# Patient Record
Sex: FEMALE | Race: WHITE | Employment: UNEMPLOYED | URBAN - METROPOLITAN AREA
[De-identification: names, ages, dates, MRNs, and addresses within clinical notes are randomized per-mention and may not be internally consistent; named-entity substitution may affect disease eponyms.]

---

## 2019-05-12 NOTE — PROGRESS NOTES
esophageal dysmotility, denies change in bowel habits or Sx of IBD  Genitourinary: denies change in urine amount or urine appearance, denies frothy urine or Hx of nephrolithiasis  Hematologic/Lymphatic: denies abnormal bruising or bleeding, denies Hx of blood clots or recurrent miscarriages, denies swollen LNs  Musculoskeletal:  refer to above HPI   Neurological: she reports occasional numbness and tingling \"sometimes in her arms, my hands and my feet\", denies focal weakness, denies Hx of seizure, denies change in gait, balance, or memory  Psychiatric: she feels depressed and anxiety on Lorazepam PRN states this gives her a HA  Endocrine: denies cold or heat intolerance  Allergic/Immunologic: denies nasal congestion, chronic asthma, or hives    Past Medical History:   Diagnosis Date    Anxiety         No past surgical history on file.     Social History     Socioeconomic History    Marital status:      Spouse name: Not on file    Number of children: Not on file    Years of education: Not on file    Highest education level: Not on file   Occupational History    Not on file   Social Needs    Financial resource strain: Not on file    Food insecurity:     Worry: Not on file     Inability: Not on file    Transportation needs:     Medical: Not on file     Non-medical: Not on file   Tobacco Use    Smoking status: Not on file   Substance and Sexual Activity    Alcohol use: Not on file    Drug use: Not on file    Sexual activity: Not on file   Lifestyle    Physical activity:     Days per week: Not on file     Minutes per session: Not on file    Stress: Not on file   Relationships    Social connections:     Talks on phone: Not on file     Gets together: Not on file     Attends Yazidism service: Not on file     Active member of club or organization: Not on file     Attends meetings of clubs or organizations: Not on file     Relationship status: Not on file    Intimate partner violence:     Fear of palpation, FROM but pt reports pain in b/l shoulders throughout abduction and IR, no pain w/ ER, +pain w/ Neer test  Lower extremities:   Hip: normal log roll, +VIKY and Derek test produces ipsilateral buttock/hip pain, trochanteric bursae TTP b/l   Knees: no warmth or effusion present, FROM   Ankles: no synovitis, FROM, Achilles tendons w/o swelling NTTP   Feet: no toe swelling or pain or warmth on palpation w/ FROM, negative MTP squeeze test  NEURO: CN II-XII grossly intact intact, face appears symmetrical, normal DTR, no evidence of muscle atrophy, 5/5 strength proximally and distally throughout in both upper and lower extremities, touch sensation grossly intact  INTEGUMENTARY: hyperkeratosis on plantar aspect of the R foot, there is minimal dry/scaly skin inside the R ear, no other psoriatic lesions, no scalp or nail involvement, pt has painted toenails, no petechiae, bruises, or palpable purpura, no patchy alopecia, no clubbing or digital ulcers  PSYCH: normal mood    LABS:  I personally reviewed prior labs including:  CBC w/ diff (1/23/19): normal WBC w/ diff, H/H 10.4/33.9, normal MCV (previously low)  Renal and hepatic function wnl (9/19/18)  CRP 21.8 mg/L, ESR 28 (2/19/19)  Negative RIGOBERTO x 2 (6/20/13, 12/14/17), negative dsDNA (6/20/13)  RIGOBERTO 1:80 w/ speckled pattern (2/21/19)  Negative RF x 3 (6/20/13, 12/14/17, 2/19/19)  Negative CCP (6/20/13)  Negative HLA B27 (9/9/16)  Uric acid 5.8 (5/1/15)  Vitamin D 25 OH 21.2 (8/4/14)  Negative tissue transglutaminase IgA (5/2/15)  HgbA1c 7.4 (1/23/19)  TSH wnl (1/23/19)  Negative HIV (1/17/18)  Negative RPR (8/4/14)    RADIOLOGY REVIEW:  I personally reviewed prior imaging including:  Nuclear bone scan (11/7/14): IMPRESSION:  1. There is abnormal uptake localizing to the upper sternum in the region of the sternal manubrial articulation. This is likely degenerative in etiology. Neoplastic etiology is considered less likely.  This could be further evaluated with CT scan of the chest if clinically warranted. 2. There is some uptake localizing to the b/l first rib costochondral cartilage. Given the intensity within the cartilage I suspect this may be posttraumatic or degenerative. X-rays (5/1/15) radiology report:  C-spine:  Reversal of cervical lordosis which can be seen in muscle spasm or whiplash injury    T-spine:  No acute osseous abnormality    L-spine:  Narrowing of L5-S1 interspace    SI joints: The b/l SI joints are patent. The visualized pelvis is intact. X-RAY L-SPINE (10/2/17) radiology report:  No evidence of fracture or traumatic bony abnormality is seen. No significant degenerative change is seen. X-ray L shoulder (4/28/18):  1. No discrete bony or soft tissue abnormality identified within the L shoulder. If patient's symptoms persist or if there is concern for internal derangement, additional imaging may be warranted. MRI C-spine (1/13/14):  1.  Large, diffuse disc protrusion and marginal osteophyte formation, C5-6 disc level, resulting in a mild degree of acquired central spinal stenosis, mild narrowing of the neuroforamina bilaterally. MRI T-spine (6/17/17): IMPRESSION:  No substantial central or foraminal narrowing. No abnormal cord signal.    CT chest (10/30/13): No axillary lymphadenopathy. No mediastinal lymphadenopathy. No pleural or pericardial effusions. CTA chest (8/4/14): Findings: Heart size is normal. There is no pericardial effusion. Thoracic aorta is unremarkable. There is adequate opacification of the pulmonary arteries and there is no evidence of acute or chronic pulmonary embolism. There is no thoracic lymphadenopathy or mass. The lungs are clear. There are no acute osseous abnormalities. PROCEDURE REVIEW:  c-SCOPE (7/29/15):   FINDINGS:   Perianal and rectal exam - fresh blood noted (patient started having her menses today)   Terminal ileum - 2 ulcers noted in the terminal ileum, with inflammation, mutiple biopsies done to r/o Crohn's   Moderate diverticulosis was noted in the descending colon, sigmoid colon   Random colon biopsies were obtained from the cecum+right colon, Transverse colon, left sided colon, rectum and sent to pathology to rule out microscopic colitis/inflammatory bowel disease . Small sized internal hemorrhoids noted with end on views as well as retroflexion in the rectum   Mild inflammation was noted in the rectum, biopsies were obtained . Above results were discussed w/ the pt in detail during today's visit. Per GI (Dr. Margarita Cleary) note from 12/6/16:  10/14/2016 EGD  - Normal esophagus.  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 Biopsied. Treated with argon plasma  coagulation (APC). - Normal examined duodenum. 10/14/2016-Colonoscopy  - Three 3 to 6 mm polyps in the distal sigmoid colon. Resected and retrieved. - Diverticulosis in the sigmoid colon and in the descending colon. - Normal mucosa in the descending colon, at the splenic flexure, in the transverse colon, at the hepatic flexure,  in the ascending colon and in the cecum.  - Mild ileitis found in the terminal ileum. Biopsied. FINAL DIAGNOSIS:  A.   Stomach, biopsy:       - Chemical gastropathy.       - Negative for H. pylori on HE stain and immunostain. B.   Terminal ileum, biopsy:       - No significant abnormality. C.   Sigmoid colon, biopsy:       - Hyperplastic polyp. 10/14/2016 EGD  - Normal esophagus.  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 Biopsied. Treated with argon plasma coagulation (APC). - Normal examined duodenum. 10/14/2016-Colonoscopy  - Three 3 to 6 mm polyps in the distal sigmoid colon. Resected and retrieved. - Diverticulosis in the sigmoid colon and in the descending colon.   - Normal mucosa in the descending colon, at the splenic flexure, in the transverse colon, at the hepatic flexure,  in the ascending colon and in the cecum.  - Mild ileitis ESR elevation could be a combination of her morbid obesity and chronic anemia. Will complete rheumatologic w/u by checking MRI of the symptomatic hand (L hand) and MRI of pelvis to evaluate for subclinical synovitis/tenosynovitis although I have low suspicion for inflammatory arthritis. Discussed the disease course of fibromyalgia, unfortunately pt is not interested in pharmacotherapy (declined Gabapentin and states she could not tolerate Cyclobenzaprine or Tizanidine in the past) currently and she is also not interested in PT. She has pan-positive ROS and unfortunately is dissatisfied w/ multiple specialists in the past.  Per pt request made referral to GI for her chronic GERD, epigatric pain, chronic anemia, and prior abnormal endoscopy findings. Made referral to Dermatology for second opinion/skin Bx on her chronic campos plantar aspect of R foot, she has no other psoriatic lesions on exam today. Cont Clobetasol cream as prescribed. Orders Placed This Encounter   Procedures    MRI Hand Left WO Contrast     Standing Status:   Future     Standing Expiration Date:   5/14/2020     Order Specific Question:   Reason for exam:     Answer:   fat suppression/STIR sequences evaluate for synovitis, tenosynovitis, other inflammatory changes and infection    MRI PELVIS WO CONTRAST     Standing Status:   Future     Standing Expiration Date:   5/14/2020     Order Specific Question:   Reason for exam:     Answer:   STIR sequence evaluate for sacroiliitis    RIGOBERTO     Standing Status:   Future     Standing Expiration Date:   5/14/2020    C3 Complement     Standing Status:   Future     Standing Expiration Date:   11/14/2019    C4 Complement     Standing Status:   Future     Standing Expiration Date:   11/14/2019    Urinalysis with Microscopic     Standing Status:   Future     Standing Expiration Date:   11/14/2019     Order Specific Question:   SPECIFY(EX-CATH,MIDSTREAM,CYSTO,ETC)?      Answer:   midstream    GABRIELLA Real MD, Gastroenterology, [facility]     Referral Priority:   Routine     Referral Type:   Eval and Treat     Referral Reason:   Specialty Services Required     Referred to Provider:   Naheed Sheppard MD     Requested Specialty:   Gastroenterology     Number of Visits Requested:   Iraj Rodriguez MD, Dermatology, [facility]     Referral Priority:   Routine     Referral Type:   Eval and Treat     Referral Reason:   Specialty Services Required     Referred to Provider:   Sultana Barnett MD     Requested Specialty:   Dermatology     Number of Visits Requested:   1     Outpatient Encounter Medications as of 5/14/2019   Medication Sig Dispense Refill    acetaminophen-codeine (TYLENOL/CODEINE #3) 300-30 MG per tablet Take by mouth.  clobetasol (TEMOVATE) 0.05 % cream Apply topically      calcipotriene (DOVONEX) 0.005 % cream Apply topically      lorazepam (ATIVAN) 1 MG tablet Take 1 mg by mouth as needed.  [DISCONTINUED] Omega-3 Fatty Acids (FISH OIL PO) Take  by mouth. No facility-administered encounter medications on file as of 5/14/2019. Return if symptoms worsen or fail to improve. 5/14/2019 2:23 PM      Thank you very much for allowing me to participate in this patient's care. Please do not hesitate to contact me if I can be of further assistance. Sending consult note to referring provider, CHARLINE Sandoval. Akin Saldivar MD  [facility]  [address] [place] [address] [address]  MZKH:[phone]  JOANN:[phone]    NOTE: This report is transcribed by using voice recognition software dragon. Every effort is made to ensure accuracy; however, inadvertent computerized transcription errors may be present.

## 2019-05-14 ENCOUNTER — OFFICE VISIT (OUTPATIENT)
Dept: RHEUMATOLOGY | Age: 39
End: 2019-05-14
Payer: COMMERCIAL

## 2019-05-14 VITALS
WEIGHT: 293 LBS | HEART RATE: 97 BPM | SYSTOLIC BLOOD PRESSURE: 145 MMHG | HEIGHT: 67 IN | DIASTOLIC BLOOD PRESSURE: 102 MMHG | BODY MASS INDEX: 45.99 KG/M2

## 2019-05-14 DIAGNOSIS — R70.0 ELEVATED ERYTHROCYTE SEDIMENTATION RATE: ICD-10-CM

## 2019-05-14 DIAGNOSIS — R79.82 ELEVATED C-REACTIVE PROTEIN (CRP): ICD-10-CM

## 2019-05-14 DIAGNOSIS — R76.8 POSITIVE ANA (ANTINUCLEAR ANTIBODY): ICD-10-CM

## 2019-05-14 DIAGNOSIS — R53.82 CHRONIC FATIGUE: ICD-10-CM

## 2019-05-14 DIAGNOSIS — G89.29 CHRONIC BACK PAIN GREATER THAN 3 MONTHS DURATION: ICD-10-CM

## 2019-05-14 DIAGNOSIS — M25.50 MULTIPLE JOINT PAIN: Primary | ICD-10-CM

## 2019-05-14 DIAGNOSIS — M54.9 CHRONIC BACK PAIN GREATER THAN 3 MONTHS DURATION: ICD-10-CM

## 2019-05-14 DIAGNOSIS — M79.7 FIBROMYALGIA: ICD-10-CM

## 2019-05-14 DIAGNOSIS — L40.9 PSORIASIS: ICD-10-CM

## 2019-05-14 DIAGNOSIS — R10.13 EPIGASTRIC PAIN: ICD-10-CM

## 2019-05-14 DIAGNOSIS — M25.50 MULTIPLE JOINT PAIN: ICD-10-CM

## 2019-05-14 LAB
BILIRUBIN URINE: NEGATIVE
BLOOD, URINE: NEGATIVE
C3 COMPLEMENT: 226.4 MG/DL (ref 90–180)
C4 COMPLEMENT: 33.5 MG/DL (ref 10–40)
CLARITY: CLEAR
COLOR: YELLOW
EPITHELIAL CELLS, UA: 7 /HPF (ref 0–5)
GLUCOSE URINE: NEGATIVE MG/DL
HYALINE CASTS: 6 /LPF (ref 0–8)
KETONES, URINE: NEGATIVE MG/DL
LEUKOCYTE ESTERASE, URINE: NEGATIVE
MICROSCOPIC EXAMINATION: ABNORMAL
NITRITE, URINE: NEGATIVE
PH UA: 6 (ref 5–8)
PROTEIN UA: NEGATIVE MG/DL
RBC UA: 8 /HPF (ref 0–4)
SPECIFIC GRAVITY UA: 1.02 (ref 1–1.03)
UROBILINOGEN, URINE: 0.2 E.U./DL
WBC UA: 2 /HPF (ref 0–5)

## 2019-05-14 PROCEDURE — G8427 DOCREV CUR MEDS BY ELIG CLIN: HCPCS | Performed by: INTERNAL MEDICINE

## 2019-05-14 PROCEDURE — G8417 CALC BMI ABV UP PARAM F/U: HCPCS | Performed by: INTERNAL MEDICINE

## 2019-05-14 PROCEDURE — 99245 OFF/OP CONSLTJ NEW/EST HI 55: CPT | Performed by: INTERNAL MEDICINE

## 2019-05-14 RX ORDER — CALCIPOTRIENE 50 UG/G
CREAM TOPICAL
COMMUNITY
Start: 2018-05-10

## 2019-05-14 RX ORDER — CLOBETASOL PROPIONATE 0.5 MG/G
CREAM TOPICAL
COMMUNITY
Start: 2018-05-10

## 2019-05-14 RX ORDER — ACETAMINOPHEN AND CODEINE PHOSPHATE 300; 30 MG/1; MG/1
TABLET ORAL
COMMUNITY

## 2019-05-15 LAB — ANTI-NUCLEAR ANTIBODY (ANA): NEGATIVE

## 2019-05-29 ENCOUNTER — TELEPHONE (OUTPATIENT)
Dept: INTERNAL MEDICINE CLINIC | Age: 39
End: 2019-05-29

## 2019-05-29 NOTE — TELEPHONE ENCOUNTER
Pt calling for lab results from 5/14/19. Also, pt said during her OV Dr Senia Bray was pressing on her spine. The area becam very painful and made it difficult for her to walk. She went to ER and was given a shot of Toradol and the area is feeling better.

## 2019-05-29 NOTE — TELEPHONE ENCOUNTER
Please call pt back and let her know that her repeat RIGOBERTO and lupus activity markers were normal.  Her urine sample showed minimal amount of blood. I'd like her to get the MRI studies I ordered and I'll see her back after the MRI studies to go over the results.

## 2019-06-17 ENCOUNTER — HOSPITAL ENCOUNTER (OUTPATIENT)
Dept: MRI IMAGING | Age: 39
Discharge: HOME | End: 2019-06-17
Payer: COMMERCIAL

## 2019-06-17 DIAGNOSIS — G89.29 CHRONIC BACK PAIN GREATER THAN 3 MONTHS DURATION: ICD-10-CM

## 2019-06-17 DIAGNOSIS — M79.7 FIBROMYALGIA: ICD-10-CM

## 2019-06-17 DIAGNOSIS — R70.0 ELEVATED ERYTHROCYTE SEDIMENTATION RATE: ICD-10-CM

## 2019-06-17 DIAGNOSIS — R79.82 ELEVATED C-REACTIVE PROTEIN (CRP): ICD-10-CM

## 2019-06-17 DIAGNOSIS — R76.8 POSITIVE ANA (ANTINUCLEAR ANTIBODY): ICD-10-CM

## 2019-06-17 DIAGNOSIS — M25.50 MULTIPLE JOINT PAIN: ICD-10-CM

## 2019-06-17 DIAGNOSIS — M54.9 CHRONIC BACK PAIN GREATER THAN 3 MONTHS DURATION: ICD-10-CM

## 2019-06-17 PROCEDURE — 73218 MRI UPPER EXTREMITY W/O DYE: CPT

## 2019-06-17 PROCEDURE — 72195 MRI PELVIS W/O DYE: CPT

## 2019-06-19 ENCOUNTER — TELEPHONE (OUTPATIENT)
Dept: INTERNAL MEDICINE CLINIC | Age: 39
End: 2019-06-19